# Patient Record
Sex: FEMALE | Race: BLACK OR AFRICAN AMERICAN | NOT HISPANIC OR LATINO | Employment: OTHER | URBAN - METROPOLITAN AREA
[De-identification: names, ages, dates, MRNs, and addresses within clinical notes are randomized per-mention and may not be internally consistent; named-entity substitution may affect disease eponyms.]

---

## 2021-08-01 ENCOUNTER — APPOINTMENT (OUTPATIENT)
Dept: GENERAL RADIOLOGY | Facility: HOSPITAL | Age: 26
End: 2021-08-01

## 2021-08-01 ENCOUNTER — HOSPITAL ENCOUNTER (EMERGENCY)
Facility: HOSPITAL | Age: 26
Discharge: HOME | End: 2021-08-01
Attending: EMERGENCY MEDICINE | Admitting: EMERGENCY MEDICINE

## 2021-08-01 VITALS
BODY MASS INDEX: 28.91 KG/M2 | DIASTOLIC BLOOD PRESSURE: 98 MMHG | HEART RATE: 65 BPM | WEIGHT: 169.31 LBS | TEMPERATURE: 98.8 F | HEIGHT: 64 IN | OXYGEN SATURATION: 100 % | RESPIRATION RATE: 15 BRPM | SYSTOLIC BLOOD PRESSURE: 133 MMHG

## 2021-08-01 DIAGNOSIS — S82.891A CLOSED FRACTURE DISLOCATION OF RIGHT ANKLE, INITIAL ENCOUNTER: Primary | ICD-10-CM
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
## 2021-08-01 PROCEDURE — 25010000002 FENTANYL CITRATE (PF) 50 MCG/ML SOLUTION: Performed by: EMERGENCY MEDICINE

## 2021-08-01 PROCEDURE — 73610 X-RAY EXAM OF ANKLE: CPT

## 2021-08-01 PROCEDURE — 25010000002 ONDANSETRON PER 1 MG: Performed by: EMERGENCY MEDICINE

## 2021-08-01 PROCEDURE — 96374 THER/PROPH/DIAG INJ IV PUSH: CPT

## 2021-08-01 PROCEDURE — 99284 EMERGENCY DEPT VISIT MOD MDM: CPT

## 2021-08-01 PROCEDURE — 96375 TX/PRO/DX INJ NEW DRUG ADDON: CPT

## 2021-08-01 RX ORDER — PROPOFOL 10 MG/ML
140 VIAL (ML) INTRAVENOUS ONCE
Status: DISCONTINUED | OUTPATIENT
Start: 2021-08-01 | End: 2021-08-01 | Stop reason: HOSPADM

## 2021-08-01 RX ORDER — FENTANYL CITRATE 50 UG/ML
50 INJECTION, SOLUTION INTRAMUSCULAR; INTRAVENOUS
Status: DISCONTINUED | OUTPATIENT
Start: 2021-08-01 | End: 2021-08-01 | Stop reason: HOSPADM

## 2021-08-01 RX ORDER — SODIUM CHLORIDE 0.9 % (FLUSH) 0.9 %
10 SYRINGE (ML) INJECTION AS NEEDED
Status: DISCONTINUED | OUTPATIENT
Start: 2021-08-01 | End: 2021-08-01 | Stop reason: HOSPADM

## 2021-08-01 RX ORDER — HYDROCODONE BITARTRATE AND ACETAMINOPHEN 7.5; 325 MG/1; MG/1
1 TABLET ORAL EVERY 4 HOURS PRN
Qty: 12 TABLET | Refills: 0 | OUTPATIENT
Start: 2021-08-01 | End: 2021-08-04

## 2021-08-01 RX ORDER — ONDANSETRON 2 MG/ML
4 INJECTION INTRAMUSCULAR; INTRAVENOUS ONCE
Status: COMPLETED | OUTPATIENT
Start: 2021-08-01 | End: 2021-08-01

## 2021-08-01 RX ORDER — PROPOFOL 10 MG/ML
VIAL (ML) INTRAVENOUS
Status: DISCONTINUED
Start: 2021-08-01 | End: 2021-08-01 | Stop reason: HOSPADM

## 2021-08-01 RX ORDER — HYDROCODONE BITARTRATE AND ACETAMINOPHEN 7.5; 325 MG/1; MG/1
1 TABLET ORAL ONCE
Status: COMPLETED | OUTPATIENT
Start: 2021-08-01 | End: 2021-08-01

## 2021-08-01 RX ADMIN — ONDANSETRON 4 MG: 2 INJECTION INTRAMUSCULAR; INTRAVENOUS at 16:13

## 2021-08-01 RX ADMIN — HYDROCODONE BITARTRATE AND ACETAMINOPHEN 1 TABLET: 7.5; 325 TABLET ORAL at 14:14

## 2021-08-01 RX ADMIN — FENTANYL CITRATE 50 MCG: 50 INJECTION INTRAMUSCULAR; INTRAVENOUS at 19:06

## 2021-08-01 RX ADMIN — SODIUM CHLORIDE 1000 ML: 9 INJECTION, SOLUTION INTRAVENOUS at 16:12

## 2021-08-01 NOTE — ED PROVIDER NOTES
Time: 6:24 PM EDT  Arrived by: private vehicle  Chief Complaint: right ankle injury/pain  History provided by: patient  History is limited by: N/A    History of Present Illness:  Patient is a 26 y.o. year old female that presents to the emergency department with right ankle pain/injury due to a fall while skating today. Pt also notes swelling and is unable to ambulate or bear weight on her right ankle.       Ankle Injury  Location:  Right ankle  Severity:  Moderate  Onset quality:  Sudden  Duration: today.  Timing:  Constant  Progression:  Unchanged  Chronicity:  New  Context:  Fell while skating  Relieved by:  Nothing  Worsened by:  Ambulation and weight-bearing  Ineffective treatments:  None tried  Associated symptoms: no chest pain, no cough, no diarrhea, no fever, no rash, no shortness of breath and no vomiting    Ankle Pain  Location:  Ankle  Time since incident: today.  Injury: yes    Mechanism of injury: fall    Fall:     Fall occurred: skating.    Impact surface:  Ice    Point of impact:  Unable to specify    Entrapped after fall: no    Ankle location:  R ankle  Pain details:     Radiates to:  Does not radiate    Severity:  Severe    Onset quality:  Sudden    Duration: today.    Timing:  Constant    Progression:  Unchanged  Chronicity:  New  Foreign body present:  No foreign bodies  Prior injury to area:  No  Relieved by:  None tried  Worsened by:  Bearing weight  Ineffective treatments:  None tried  Associated symptoms: no back pain, no fever and no neck pain            Similar Symptoms Previously: no  Recently seen: no      Patient Care Team  Primary Care Provider: Provider, No Known      Past Medical History:     No Known Allergies  History reviewed. No pertinent past medical history.  History reviewed. No pertinent surgical history.  History reviewed. No pertinent family history.    Home Medications:  Prior to Admission medications    Not on File        Social History:   PT  reports that she has never  "smoked. She has never used smokeless tobacco. She reports current alcohol use. She reports that she does not use drugs.    Record Review:  I have reviewed the patient's records in [facility].     Review of Systems  Review of Systems   Constitutional: Negative for chills and fever.   HENT: Negative for nosebleeds.    Eyes: Negative for redness.   Respiratory: Negative for cough and shortness of breath.    Cardiovascular: Negative for chest pain.   Gastrointestinal: Negative for diarrhea and vomiting.   Genitourinary: Negative for dysuria and frequency.   Musculoskeletal: Negative for back pain and neck pain.   Skin: Negative for rash.   Neurological: Negative for seizures and syncope.        Physical Exam  /98   Pulse 65   Temp 98.8 °F (37.1 °C) (Oral)   Resp 15   Ht 162.6 cm (64\")   Wt 76.8 kg (169 lb 5 oz)   SpO2 100%   BMI 29.06 kg/m²     Physical Exam  Vitals and nursing note reviewed.   Constitutional:       General: She is not in acute distress.  HENT:      Head: Normocephalic and atraumatic.      Nose: Nose normal.      Mouth/Throat:      Mouth: Mucous membranes are dry.   Eyes:      General: Lids are normal. No scleral icterus.     Pupils: Pupils are equal, round, and reactive to light.   Cardiovascular:      Rate and Rhythm: Normal rate and regular rhythm.      Heart sounds: Normal heart sounds. No murmur heard.     Pulmonary:      Effort: Pulmonary effort is normal. No respiratory distress.      Breath sounds: Normal breath sounds and air entry. No decreased air movement. No decreased breath sounds, wheezing, rhonchi or rales.   Chest:      Chest wall: No tenderness.   Abdominal:      Palpations: Abdomen is soft.      Tenderness: There is no abdominal tenderness. There is no guarding or rebound.   Musculoskeletal:         General: No tenderness. Normal range of motion.      Cervical back: Normal range of motion and neck supple. No tenderness.      Right lower leg: No edema.      Left lower leg: No " "edema.      Right ankle: No swelling. No tenderness.      Comments: Generalized pain and swelling to right ankle. Unable to bear weight. Foot is neurovascularly intact.    Skin:     General: Skin is warm and dry.      Findings: No rash.   Neurological:      General: No focal deficit present.      Mental Status: She is alert and oriented to person, place, and time.      Sensory: Sensation is intact. No sensory deficit.      Motor: Motor function is intact. No weakness.   Psychiatric:         Behavior: Behavior normal.                  ED Course  /98   Pulse 65   Temp 98.8 °F (37.1 °C) (Oral)   Resp 15   Ht 162.6 cm (64\")   Wt 76.8 kg (169 lb 5 oz)   SpO2 100%   BMI 29.06 kg/m²   No results found for this or any previous visit.  Medications   HYDROcodone-acetaminophen (NORCO) 7.5-325 MG per tablet 1 tablet (1 tablet Oral Given 8/1/21 1414)   sodium chloride 0.9 % bolus 1,000 mL (0 mL Intravenous Stopped 8/1/21 1933)   ondansetron (ZOFRAN) injection 4 mg (4 mg Intravenous Given 8/1/21 1613)     XR Ankle 3+ View Right    Result Date: 8/1/2021  Narrative: PROCEDURE: XR ANKLE 3+ VW RIGHT  COMPARISON: 8/1/2021, 1400 hours.  INDICATIONS: POST REDUCTION (CLOSED) OF AN ACUTE RIGHT ANKLE FRACTURE DISLOCATION; IMAGING FOLLOW-UP.  FINDINGS: Three views were obtained of the right ankle.  After closed reduction of the right ankle fracture-dislocation, there is improvement in alignment.  A cast is in place obscuring detail.  Please see the prior right ankle x-ray exam report from 1400 hours, same day, for further detail regarding findings.  CONCLUSION: Favorable progression is suggested radiographically after closed reduction of the acute right ankle fracture-dislocation with improvement in alignment.  A cast is in place, obscuring detail.     STEPHANIE BERNAL JR, MD       Electronically Signed and Approved By: STEPHANIE BERNAL JR, MD on 8/01/2021 at 18:51             XR Ankle 3+ View Right    Result Date: " 8/1/2021  Narrative: PROCEDURE: XR ANKLE 3+ VW RIGHT  COMPARISON: None  INDICATIONS: Twisted RIGHT ANKLE while roller skating/PAIN  FINDINGS:  There is a comminuted fracture of the distal fibula.  There is fracture of the medial malleolus.  There is disruption of the ankle mortise.  The talus is intact.  CONCLUSION: Fracture dislocation of the ankle as described.      KARI ZUNIGA MD       Electronically Signed and Approved By: KARI ZUNIGA MD on 8/01/2021 at 14:21               Procedures/EKGs:  FX Dislocation    Date/Time: 8/1/2021 6:33 PM  Performed by: Nik Springer DO  Authorized by: Nki Springer DO     Consent:     Consent obtained:  Verbal    Consent given by:  Patient    Risks discussed:  Nerve damage, pain and vascular damage    Alternatives discussed:  No treatment, delayed treatment, alternative treatment, observation and referral  Injury:     Injury location:  Ankle    Ankle injury location:  R ankle    Ankle fracture type: medial malleolus    Pre-procedure assessment:     Neurological function: normal      Range of motion: reduced    Sedation:     Sedation type:  Moderate (conscious) sedation  Anesthesia (see MAR for exact dosages):     Anesthesia method: Propofol.  Procedure details:     Manipulation performed: yes      Pin inserted: no      Reduction successful: yes      X-ray confirmed reduction: yes      Supplies used:  Ortho-Glass  Post-procedure details:     Neurological function: normal      Distal perfusion: normal      Range of motion: improved      Patient tolerance of procedure:  Tolerated well, no immediate complications  Comments:      140 of Propofol used. Flexion of ankle, downward pressure.                 Medical Decision Making:                     MDM  Number of Diagnoses or Management Options  Closed fracture dislocation of right ankle, initial encounter  Fall from roller skates, initial encounter  Diagnosis management comments: Please see my conscious sedation and  reduction note in the emergency department.  The patient was stable after her right ankle reduction.  Repeat x-rays showed improved alignment.  Her distal neurovascular function is intact.  I personally applied the Ortho-Glass posterior and stirrup splint with assistance of the tech.  Patient is to follow-up with either local orthopedist or her orthopedist at [facility].       Amount and/or Complexity of Data Reviewed  Tests in the radiology section of CPT®: reviewed         Final diagnoses:   Closed fracture dislocation of right ankle, initial encounter   Fall from roller skates, initial encounter        Disposition:  ED Disposition     ED Disposition Condition Comment    Discharge Stable           Documentation assistance provided by Leda Reeves acting as scribe for Nik Springer DO. Information recorded by the scribe was done at my direction and has been verified and validated by me.        Leda Reeves  08/01/21 1850       Nik Springer DO  08/02/21 1250

## 2021-08-01 NOTE — ED PROVIDER NOTES
Subjective   Patient states fell while roller skating today.  And has developed right ankle pain and swelling, patient states unable to ambulate or do any weightbearing with right ankle.      History provided by:  Patient   used: No    Ankle Injury  Location:  Right ankle  Severity:  Moderate  Onset quality:  Sudden  Duration: Today.  Timing:  Constant  Progression:  Unchanged  Chronicity:  New  Context:  Fell while skating.  Worsened by:  Ambulation and weightbearing.  Associated symptoms: no abdominal pain, no chest pain, no congestion, no cough, no diarrhea, no ear pain, no fever, no headaches, no nausea, no shortness of breath, no sore throat and no vomiting    Ankle Pain  Associated symptoms: no fever        Review of Systems   Constitutional: Negative for chills and fever.   HENT: Negative for congestion, ear pain and sore throat.    Eyes: Negative for pain.   Respiratory: Negative for cough, chest tightness and shortness of breath.    Cardiovascular: Negative for chest pain.   Gastrointestinal: Negative for abdominal pain, diarrhea, nausea and vomiting.   Genitourinary: Negative for flank pain and hematuria.   Musculoskeletal: Positive for joint swelling.        Right ankle pain and swelling.   Skin: Negative for pallor.   Neurological: Negative for seizures and headaches.   All other systems reviewed and are negative.      History reviewed. No pertinent past medical history.    No Known Allergies    History reviewed. No pertinent surgical history.    History reviewed. No pertinent family history.    Social History     Socioeconomic History   • Marital status:      Spouse name: Not on file   • Number of children: Not on file   • Years of education: Not on file   • Highest education level: Not on file   Tobacco Use   • Smoking status: Never Smoker   • Smokeless tobacco: Never Used   Substance and Sexual Activity   • Alcohol use: Yes     Comment: Occasional   • Drug use: Never            Objective   Physical Exam  Constitutional:       General: She is not in acute distress.     Appearance: Normal appearance. She is not toxic-appearing.   HENT:      Head: Normocephalic.      Mouth/Throat:      Mouth: Mucous membranes are dry.      Pharynx: Oropharynx is clear.   Eyes:      Extraocular Movements: Extraocular movements intact.      Conjunctiva/sclera: Conjunctivae normal.      Pupils: Pupils are equal, round, and reactive to light.   Cardiovascular:      Rate and Rhythm: Normal rate and regular rhythm.      Pulses: Normal pulses.   Pulmonary:      Effort: Pulmonary effort is normal. No respiratory distress.      Breath sounds: No stridor. No wheezing, rhonchi or rales.   Abdominal:      General: Abdomen is flat. Bowel sounds are normal.      Palpations: Abdomen is soft.   Musculoskeletal:         General: No swelling, tenderness or deformity. Normal range of motion.      Cervical back: Normal range of motion.      Right lower leg: No edema.      Left lower leg: No edema.        Legs:    Skin:     General: Skin is warm and dry.      Findings: No rash.   Neurological:      General: No focal deficit present.      Mental Status: She is alert. Mental status is at baseline.      Cranial Nerves: Cranial nerves are intact.      Sensory: Sensation is intact.      Motor: Motor function is intact.      Coordination: Coordination is intact.   Psychiatric:         Attention and Perception: Attention and perception normal.         Mood and Affect: Mood normal.         Speech: Speech normal.         Behavior: Behavior normal. Behavior is cooperative.         Procedures           ED Course  ED Course as of Aug 01 1901   Sun Aug 01, 2021   1838 HPI and PE are copied from mid-level initial chart in order to do procedural sedation and reduction.     [JW]      ED Course User Index  [JW] Leda Reeves      Consulted Dr. Redding, orthopedic surgeon, will have ankle reduced in the emergency department and  splint appropriately.  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  Patient counseled, on need for rapid orthopedic surgeon follow-up within a week and surgery.    Patient states will contact her instructors to whether she needs to follow-up emergently with Dr. Redding or, return to home duty station for orthopedic surgery and follow-up.        Patient states notified by her superiors that they will center back to her home duty station for orthopedic follow-up and surgery.                             MDM  Number of Diagnoses or Management Options  Closed fracture dislocation of right ankle, initial encounter  Fall from roller skates, initial encounter  Diagnosis management comments: I have spoken with the Ms. Petit. I have explained the patient´s condition, diagnoses and treatment plan based on the information available to me at this time. I have answered the patient's questions and addressed any concerns. The patient has a good  understanding of the patient´s diagnosis, condition, and treatment plan as can be expected at this point. The vital signs have been stable. The patient´s condition is stable and appropriate for discharge from the emergency department.      The patient will pursue further outpatient evaluation with the primary care physician or other designated or consulting physician as outlined in the discharge instructions. They are agreeable to this plan of care and follow-up instructions have been explained in detail. The patient has received these instructions in written format and have expressed an understanding of the discharge instructions. The patient is aware that any significant change in condition or worsening of symptoms should prompt an immediate return to this or the closest emergency department or call to 911.       Amount and/or Complexity of Data Reviewed  Tests in the radiology section of CPT®: ordered and  reviewed    Risk of Complications, Morbidity, and/or Mortality  Presenting problems: low  Diagnostic procedures: low  Management options: low    Patient Progress  Patient progress: stable      Final diagnoses:   Closed fracture dislocation of right ankle, initial encounter   Fall from roller skates, initial encounter       ED Disposition  ED Disposition     ED Disposition Condition Comment    Discharge Stable           Provider, No Known  [facility] [address]      Follow-up with your PCM at [place].    Juanjo Redding MD  [address] [place], [place] [id number] [phone]    Schedule an appointment as soon as possible for a visit in 2 days  Follow-up with Dr. Redding's office this week call for appointment.         Medication List      New Prescriptions    HYDROcodone-acetaminophen 7.5-325 MG per tablet  Commonly known as: NORCO  Take 1 tablet by mouth Every 4 (Four) Hours As Needed for Moderate Pain  for up to 12 doses.           Where to Get Your Medications      These medications were sent to [id number] - [place], [place] - [address] AT [facility] - [phone] [facility] [phone]   [address], [facility] [address]    Hours: 24-hours Phone: [phone]   · HYDROcodone-acetaminophen 7.5-325 MG per tablet          Bruno Armas APRN  08/01/21 1528       Bruon Armas APRN  08/01/21 1811       Bruno Armas APRN  08/01/21 1901

## 2021-08-04 ENCOUNTER — HOSPITAL ENCOUNTER (EMERGENCY)
Facility: HOSPITAL | Age: 26
Discharge: HOME | End: 2021-08-04
Admitting: EMERGENCY MEDICINE

## 2021-08-04 VITALS
HEIGHT: 64 IN | WEIGHT: 169.31 LBS | SYSTOLIC BLOOD PRESSURE: 131 MMHG | OXYGEN SATURATION: 97 % | RESPIRATION RATE: 16 BRPM | TEMPERATURE: 98.2 F | HEART RATE: 80 BPM | BODY MASS INDEX: 28.91 KG/M2 | DIASTOLIC BLOOD PRESSURE: 77 MMHG

## 2021-08-04 DIAGNOSIS — S82.891A CLOSED FRACTURE OF RIGHT ANKLE, INITIAL ENCOUNTER: Primary | ICD-10-CM

## 2021-08-04 PROCEDURE — 99283 EMERGENCY DEPT VISIT LOW MDM: CPT

## 2021-08-04 RX ORDER — IBUPROFEN 800 MG/1
800 TABLET ORAL EVERY 6 HOURS PRN
Qty: 20 TABLET | Refills: 0 | Status: SHIPPED | OUTPATIENT
Start: 2021-08-04

## 2021-08-04 RX ORDER — HYDROCODONE BITARTRATE AND ACETAMINOPHEN 5; 325 MG/1; MG/1
1 TABLET ORAL EVERY 8 HOURS PRN
Qty: 20 TABLET | Refills: 0 | Status: SHIPPED | OUTPATIENT
Start: 2021-08-04

## 2021-08-04 RX ORDER — HYDROCODONE BITARTRATE AND ACETAMINOPHEN 5; 325 MG/1; MG/1
1 TABLET ORAL EVERY 8 HOURS PRN
Qty: 20 TABLET | Refills: 0 | Status: SHIPPED | OUTPATIENT
Start: 2021-08-04 | End: 2021-08-04 | Stop reason: SDUPTHER

## 2021-08-04 RX ORDER — OXYCODONE HYDROCHLORIDE AND ACETAMINOPHEN 5; 325 MG/1; MG/1
1 TABLET ORAL EVERY 6 HOURS PRN
Status: DISCONTINUED | OUTPATIENT
Start: 2021-08-04 | End: 2021-08-04 | Stop reason: HOSPADM

## 2021-08-04 RX ADMIN — OXYCODONE HYDROCHLORIDE AND ACETAMINOPHEN 1 TABLET: 5; 325 TABLET ORAL at 13:32

## 2021-08-04 NOTE — ED PROVIDER NOTES
Subjective   Patient has a complex ankle fracture and was splinted in the emergency room a couple days ago.  She presents for worsening pain and a pain med refill.  She is from out of town and needs to follow-up with her orthopedist when she gets back on Friday.    She describes the pain is throbbing, there is no modifying or relief factors.  Patient has not pregnant at this time.  She is up-to-date on immunizations.  Has no associated symptoms such as nausea vomiting diarrhea, chest pain, shortness of breath      History provided by:  Patient   used: No        Review of Systems   Constitutional: Negative for chills and fever.   HENT: Negative for congestion, ear pain and sore throat.    Eyes: Negative for pain.   Respiratory: Negative for cough, chest tightness and shortness of breath.    Cardiovascular: Negative for chest pain.   Gastrointestinal: Negative for abdominal pain, diarrhea, nausea and vomiting.   Genitourinary: Negative for flank pain and hematuria.   Musculoskeletal: Negative for joint swelling.        Did not remove splint.    Skin: Negative for pallor.   Neurological: Negative for seizures and headaches.   All other systems reviewed and are negative.      Past Medical History:   Diagnosis Date   • Ankle fracture, right        No Known Allergies    History reviewed. No pertinent surgical history.    History reviewed. No pertinent family history.    Social History     Socioeconomic History   • Marital status:      Spouse name: Not on file   • Number of children: Not on file   • Years of education: Not on file   • Highest education level: Not on file   Tobacco Use   • Smoking status: Never Smoker   • Smokeless tobacco: Never Used   Substance and Sexual Activity   • Alcohol use: Yes     Comment: Occasional   • Drug use: Never           Objective   Physical Exam  Vitals and nursing note reviewed.   Constitutional:       General: She is not in acute distress.     Appearance:  Normal appearance. She is not toxic-appearing.   HENT:      Head: Normocephalic and atraumatic.      Mouth/Throat:      Mouth: Mucous membranes are moist.   Eyes:      Extraocular Movements: Extraocular movements intact.      Pupils: Pupils are equal, round, and reactive to light.   Cardiovascular:      Rate and Rhythm: Normal rate and regular rhythm.      Pulses: Normal pulses.      Heart sounds: Normal heart sounds.   Pulmonary:      Effort: Pulmonary effort is normal. No respiratory distress.      Breath sounds: Normal breath sounds.   Abdominal:      General: Abdomen is flat.      Palpations: Abdomen is soft.      Tenderness: There is no abdominal tenderness.   Musculoskeletal:         General: Normal range of motion.      Cervical back: Normal range of motion and neck supple.   Skin:     General: Skin is warm and dry.   Neurological:      Mental Status: She is alert and oriented to person, place, and time. Mental status is at baseline.         Procedures           ED Course                                           MDM  Number of Diagnoses or Management Options  Closed fracture of right ankle, initial encounter: established and worsening  Risk of Complications, Morbidity, and/or Mortality  Presenting problems: low  Diagnostic procedures: low  Management options: low        Final diagnoses:   Closed fracture of right ankle, initial encounter       ED Disposition  ED Disposition     ED Disposition Condition Comment    Discharge Stable           No follow-up provider specified.       Medication List      New Prescriptions    HYDROcodone-acetaminophen 5-325 MG per tablet  Commonly known as: NORCO  Take 1 tablet by mouth Every 8 (Eight) Hours As Needed for Moderate Pain .  Replaces: HYDROcodone-acetaminophen 7.5-325 MG per tablet        Stop    HYDROcodone-acetaminophen 7.5-325 MG per tablet  Commonly known as: NORCO  Replaced by: HYDROcodone-acetaminophen 5-325 MG per tablet           Where to Get Your Medications       These medications were sent to [id number] - [place], [place] - [address] AT [facility] - [phone]  - [phone]   [address] [place] [address] [place] [address]    Hours: 24-hours Phone: [phone]   · HYDROcodone-acetaminophen 5-325 MG per tablet          Elmer Olvieira APRN  08/04/21 1607

## 2021-08-04 NOTE — DISCHARGE INSTRUCTIONS
Follow up immediately with your orthopedist when you return home. Return for new or worsening symptoms.